# Patient Record
(demographics unavailable — no encounter records)

---

## 2024-11-23 NOTE — DISCUSSION/SUMMARY
[FreeTextEntry1] : EKG:NSR\par   feel cp is non cardiac \par  reveiwed labs\par  LDL= 142mg%\par  apoprotein high- feel would benefit from statin\par  would try Crestor for HLD\par  will get NICHOLE/CCTA for CP

## 2024-11-23 NOTE — HISTORY OF PRESENT ILLNESS
[FreeTextEntry1] : Last seen 6/27/2023 couldn't tolerate lipitor in past was well until about a week ago when began to get stressed( his partners were arguing) and has had constant upper left chest discomfort, no assocaited Sx, no sob. pain mostly in anterior left armpit- he had heavy workout with weights/Tonal and exercises regularly w/o any Sx